# Patient Record
Sex: MALE | Race: WHITE | Employment: STUDENT | URBAN - METROPOLITAN AREA
[De-identification: names, ages, dates, MRNs, and addresses within clinical notes are randomized per-mention and may not be internally consistent; named-entity substitution may affect disease eponyms.]

---

## 2020-05-05 ENCOUNTER — MEDICAL CORRESPONDENCE (OUTPATIENT)
Dept: TRANSPLANT | Facility: CLINIC | Age: 20
End: 2020-05-05

## 2020-05-15 ENCOUNTER — MEDICAL CORRESPONDENCE (OUTPATIENT)
Dept: TRANSPLANT | Facility: CLINIC | Age: 20
End: 2020-05-15

## 2020-05-18 ENCOUNTER — MEDICAL CORRESPONDENCE (OUTPATIENT)
Dept: TRANSPLANT | Facility: CLINIC | Age: 20
End: 2020-05-18

## 2020-05-18 ENCOUNTER — CARE COORDINATION (OUTPATIENT)
Dept: TRANSPLANT | Facility: CLINIC | Age: 20
End: 2020-05-18

## 2020-05-18 NOTE — PROGRESS NOTES
"Spenser Juana, Mark's dad, emailed the following photos in preparation for their video NT on Wednesday, May 20th, 2020. Photos forwarded to primary BMT physician, Shelley Butt.     Spenser's email read:    \"Jamie Oropeza,    Here are a few photos of Mark s back which is scarred from the many years of blisters. That lower back/hips were always the problem area; however they healed up for now. The area most affected now is the upper thigh where your legs begins. 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    Thanks  Radhames\"               "

## 2020-05-19 ENCOUNTER — MEDICAL CORRESPONDENCE (OUTPATIENT)
Dept: TRANSPLANT | Facility: CLINIC | Age: 20
End: 2020-05-19

## 2020-05-20 ENCOUNTER — VIRTUAL VISIT (OUTPATIENT)
Dept: TRANSPLANT | Facility: CLINIC | Age: 20
End: 2020-05-20
Attending: PEDIATRICS
Payer: COMMERCIAL

## 2020-05-20 DIAGNOSIS — Q81.2 RECESSIVE DYSTROPHIC EPIDERMOLYSIS BULLOSA: Primary | ICD-10-CM

## 2020-05-20 PROCEDURE — 40001009 ZZH VIDEO/TELEPHONE VISIT; NO CHARGE

## 2020-05-20 NOTE — NURSING NOTE
"Mark Arias is a 20 year old male who is being evaluated via a billable video visit.      The patient has been notified of following:     \"This video visit will be conducted via a call between you and your physician/provider. We have found that certain health care needs can be provided without the need for an in-person physical exam.  This service lets us provide the care you need with a video conversation.  If a prescription is necessary we can send it directly to your pharmacy.  If lab work is needed we can place an order for that and you can then stop by our lab to have the test done at a later time.    If during the course of the call the physician/provider feels a video visit is not appropriate, you will not be charged for this service.\"     Patient has given verbal consent for Video visit? Yes    Patient would like the video invitation sent by: Send to e-mail at: [email or web address]    Video Start Time: 11:42 AM    Mark Arias complains of    Chief Complaint   Patient presents with     Consult     Patient is here for new EB consult       Data Unavailable  Data Unavailable      I have reviewed and updated the patient's Past Medical History, Social History, Family History and Medication List.    ALLERGIES  Amoxicillin      "

## 2020-05-20 NOTE — LETTER
"  2020      RE: Mark Arias  [address]  [facility] [address]       Mark Arias is a 20 year old male who is being evaluated via a billable video visit.      The patient has been notified of following:     \"This video visit will be conducted via a call between you and your physician/provider. We have found that certain health care needs can be provided without the need for an in-person physical exam.  This service lets us provide the care you need with a video conversation.  If a prescription is necessary we can send it directly to your pharmacy.  If lab work is needed we can place an order for that and you can then stop by our lab to have the test done at a later time.    Video visits are billed at different rates depending on your insurance coverage.  Please reach out to your insurance provider with any questions.    If during the course of the call the physician/provider feels a video visit is not appropriate, you will not be charged for this service.\"    Patient has given verbal consent for Video visit? Yes    How would you like to obtain your AVS? Haresh    Patient would like the video invitation sent by: Send to e-mail at: [email or web address]    Will anyone else be joining your video visit? No        Video-Visit Details    Type of service:  Video Visit    Video Start Time: 11:48  Video End Time: 12:43    Originating Location (pt. Location): Home    Distant Location (provider location):  [facility] BLOOD AND MARROW TRANSPLANT     Platform used for Video Visit: Lizbeth    May 20, 2020    Nain Brandt MD  PEDIATRIC SERVICES [address]  [facility] [address]    Re: Mark Arias  MRN: [id number]  : 2000    Dear Dr. Brandt,     I had the pleasure of seeing your patient, Mark Arias, via video visit, in the Pediatric Blood and Marrow Transplant clinic on May 20, 2020 for consultation regarding potential therapies, including the role of hematopoietic cell " transplant to treat epidermolysis bullosa. His father was present for the entire video visit. Though you know his history well, I will repeat it here for our records.     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 At birth he had areas eroded skin over his feet. Diagnosis of RDEB was confirmed on skin biopsy and genetic testing. 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 The second mutation (maternally inherieted), [id number] results in a substitution of an aspartic acid for a glycine. This latter mutation has been described in patients in inversa type RDEB.         In infancy and early childhood Mark describes recurrent erosions around his ankles. He then developed blistering on his hips and his back. When he was 16-17 years old, his back spontaneously healed. He has not had any further blistering in this area, though does have residual scarring. His main affected area currently is his groin. The blistering is variable in size, and duration to heal, taking anytime from 1 day to 1 week to resolve. Mark feels that the extent of blistering in his groin has improved slightly over the years, however with the difficulty in dressing and protecting this area, he experiences significant pain and discomfort. Mark does have the occasional mouth ulcer, approximately one new ulcer per month. When he does have mouth ulcers these tend to heal quickly. He has not had any eye involvement, nor any esophageal involvement. His current skin care regime consists of vaseline and body powder to reduce friction, and padded dressing for open blisters.      Mark has been proactive in seeking out potential therapies for his EB. He     Review of Systems:  A complete review of systems was performed and is negative except as noted above.     Past Medical History: Mark is otherwise well, without any other significant past medical history.     Medications: No current regular medications.     Family History: No family history of EB. No other significant family history.     Physical Exam:  There were no vitals taken for this video visit.   Mark was alert and interactive. He appeared well.   Photos of his back were reviewed and revealed extensive bilateral scarring over his lower back, with no active erosions.    Labs: All photos, labs and medical records were reviewed by me personally.     In summary, Mark Arias is a 20 year old male, with recessive dystrophic epidermolysis bullosa, inversa type. He is seeking information about any potential therapies that might be available, including the role of allogeneic hematopoietic stem cell transplant (alloHCT) in RDEB.     We spent 55 minutes time with Mark Arias and his family first reviewing his history and then reviewing the pathophysiology of his disease and the potential treatment options including current and upcoming clinical trials and alloHCT.     We first discussed the rationale for alloHCT in RDEB, in which the healthly donor cells produce normal amounts of type VII (C7), which improved wound healing. AlloHCT does not not cure EB, and the extent of improvement has been variable. The goal is to reduce the risk of life-threatening complications such as severe infection and squamous cell carcinoma formation, to reduce body-surface-area of disease involvement as well as internal organ manifestations, and to improve quality of life. We briefly discussed the stem cell transplant process, including chemotherapy and radiation based conditioning, prolonged hospital admission and the risks including risk of infection and risk of mortality. The risk of death early post transplant depends on the health of the patient  coming in, but on average is 10%. Given the risk of the transplant, and Mark's mild disease, we would not recommend him for transplant. We then discussed other therapies at the [facility] including the use of mesenchymal stromal cells (MSCs) and epidermal skin grafting. We reviewed with the family the reasoning behind these interventions, namely the immune modulatory impact of MSCs, potential ability to secrete C7, and the use of skin grafting to support healing of chronic wounds. At our institution we have utilized MSC's post transplant (donor derived), as well as third party, as part of a clinical trial. MSC's are infused intravenously, and in the phase 1 trial of ABCB5+ MSCs, 3 doses were given, each 2 weeks apart. The infusions were well tolerated. Patients will need to travel to our institution for the infusions. There were 16 patients enrolled on the phase 1 study, which has now closed to accrual. We ancipitate opening the next phase of this trial when it is ready, and this may be an option for Mark, should he be interested.     We then discussed the current clinical trials for RDEB. Mark had sought to enroll in the [place] tissue repair trial, which utilizes systemic recombinant factor 7. Unfortunately Mark did not meet eligibility criteria, based on not having wounds of the required minimum size, at the time of assessement eligibility. We discussed that currently this is a phase 1/2 dose escalation trial, with no significant toxicities in the study subjects. Currently their aim is the establish the optimum dose and frequency of infusions for optimal effect. If this therapy becomes available commercially, off study, then this may be an option for Mark in the future. There are also topical therapies currently being explored. , is a topical gene therapy. Phase 1 and 2 trials have been completed, and there is currently no open clinical trial, with the potential for a phase 3 trial in  the future. Gene therapy is the focus of much current research. There is pre-clinical research in which keratinocyte and fibroblasts from skin are isolated via biopsy, genetically modified and expanded in the lab, and then bioprinted or skin grafted to affected sites. We hope there will be clinical trials within the next 5 years.    We talked about the comprehensive EB clinic here at the [facility], in which we co-ordinate multidisciplinary review with multiple sub-specialties in one clinic including hematology/BMT, dermatology, GI and surgery. Our team of experts have extensive experience in caring for patients with EB. Though we do not recommend alloHCT, and do not have any current open clinical trials for Mark currently, we would be very happy to review him in our comprehensive clinic, should he feel that this may be beneficial. We would also be happy to arrange an isolated dermatology review to see if there are any other suggestions for his optimal management. We would be very happy to reach out to Mark and his family should any new trials open, that he would be eligible for.        Mark Arias and his family were given the opportunity to ask questions throughout our visit today. They did ask many thoughtful questions which were answered to the best of my ability. After our discussion it appeared that the family had a good grasp of the process, risks and benefits.     It was a pleasure meeting Mark Arias and his family today. We look forward to helping to care for him in the future. If you should have any questions or concerns about my recommendations, please don't hesitate to contact us.     Sincerely,    Paris Roca MD  Pediatric BMT Fellow    I met with Mark Arias and his Father with , edited the above note, and agree with the findings and plan of care as documented. Total face-to-face time 60 minutes, >50% in counseling on management of RDEB inversa, including investigational  therapies available at our center and others in the [place].     Shelley Butt MD MPH  , Pediatric Blood and Marrow Transplantation  [facility] [phone]        Shelley Butt MD

## 2020-05-21 NOTE — PROGRESS NOTES
"Mark Arias is a 20 year old male who is being evaluated via a billable video visit.      The patient has been notified of following:     \"This video visit will be conducted via a call between you and your physician/provider. We have found that certain health care needs can be provided without the need for an in-person physical exam.  This service lets us provide the care you need with a video conversation.  If a prescription is necessary we can send it directly to your pharmacy.  If lab work is needed we can place an order for that and you can then stop by our lab to have the test done at a later time.    Video visits are billed at different rates depending on your insurance coverage.  Please reach out to your insurance provider with any questions.    If during the course of the call the physician/provider feels a video visit is not appropriate, you will not be charged for this service.\"    Patient has given verbal consent for Video visit? Yes    How would you like to obtain your AVS? Ineshart    Patient would like the video invitation sent by: Send to e-mail at: [email or web address]    Will anyone else be joining your video visit? No        Video-Visit Details    Type of service:  Video Visit    Video Start Time: 11:48  Video End Time: 12:43    Originating Location (pt. Location): Home    Distant Location (provider location):  [facility] BLOOD AND MARROW TRANSPLANT     Platform used for Video Visit: Lizbeth    May 20, 2020    Nain Brandt MD  PEDIATRIC SERVICES [place] [address]    Re: Mark Arias  MRN: [id number]  : 2000    Dear Dr. Brandt,     I had the pleasure of seeing your patient, Mark Arias, via video visit, in the Pediatric Blood and Marrow Transplant clinic on May 20, 2020 for consultation regarding potential therapies, including the role of hematopoietic cell transplant to treat epidermolysis bullosa. His father was present for the entire video visit. " Though you know his history well, I will repeat it here for our records.     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 At birth he had areas eroded skin over his feet. Diagnosis of RDEB was confirmed on skin biopsy and genetic testing. 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 The second mutation (maternally inherieted), [id number] results in a substitution of an aspartic acid for a glycine. This latter mutation has been described in patients in inversa type RDEB.         In infancy and early childhood Mark describes recurrent erosions around his ankles. He then developed blistering on his hips and his back. When he was 16-17 years old, his back spontaneously healed. He has not had any further blistering in this area, though does have residual scarring. His main affected area currently is his groin. The blistering is variable in size, and duration to heal, taking anytime from 1 day to 1 week to resolve. Mark feels that the extent of blistering in his groin has improved slightly over the years, however with the difficulty in dressing and protecting this area, he experiences significant pain and discomfort. Mark does have the occasional mouth ulcer, approximately one new ulcer per month. When he does have mouth ulcers these tend to heal quickly. He has not had any eye involvement, nor any esophageal involvement. His current skin care regime consists of vaseline and body powder to reduce friction, and padded dressing for open blisters.      Mark has been proactive in seeking out potential therapies for his EB. He     Review of Systems: A complete review of systems was performed and is negative except as noted above.     Past  Medical History: Mark is otherwise well, without any other significant past medical history.     Medications: No current regular medications.     Family History: No family history of EB. No other significant family history.     Physical Exam:  There were no vitals taken for this video visit.   Mark was alert and interactive. He appeared well.   Photos of his back were reviewed and revealed extensive bilateral scarring over his lower back, with no active erosions.    Labs: All photos, labs and medical records were reviewed by me personally.     In summary, Mark Arias is a 20 year old male, with recessive dystrophic epidermolysis bullosa, inversa type. He is seeking information about any potential therapies that might be available, including the role of allogeneic hematopoietic stem cell transplant (alloHCT) in RDEB.     We spent 55 minutes time with Mark Arias and his family first reviewing his history and then reviewing the pathophysiology of his disease and the potential treatment options including current and upcoming clinical trials and alloHCT.     We first discussed the rationale for alloHCT in RDEB, in which the healthly donor cells produce normal amounts of type VII (C7), which improved wound healing. AlloHCT does not not cure EB, and the extent of improvement has been variable. The goal is to reduce the risk of life-threatening complications such as severe infection and squamous cell carcinoma formation, to reduce body-surface-area of disease involvement as well as internal organ manifestations, and to improve quality of life. We briefly discussed the stem cell transplant process, including chemotherapy and radiation based conditioning, prolonged hospital admission and the risks including risk of infection and risk of mortality. The risk of death early post transplant depends on the health of the patient coming in, but on average is 10%. Given the risk of the transplant, and Mark's mild disease,  we would not recommend him for transplant. We then discussed other therapies at the [facility] including the use of mesenchymal stromal cells (MSCs) and epidermal skin grafting. We reviewed with the family the reasoning behind these interventions, namely the immune modulatory impact of MSCs, potential ability to secrete C7, and the use of skin grafting to support healing of chronic wounds. At our institution we have utilized MSC's post transplant (donor derived), as well as third party, as part of a clinical trial. MSC's are infused intravenously, and in the phase 1 trial of ABCB5+ MSCs, 3 doses were given, each 2 weeks apart. The infusions were well tolerated. Patients will need to travel to our institution for the infusions. There were 16 patients enrolled on the phase 1 study, which has now closed to accrual. We ancipitate opening the next phase of this trial when it is ready, and this may be an option for Mark, should he be interested.     We then discussed the current clinical trials for RDEB. Mark had sought to enroll in the [place] tissue repair trial, which utilizes systemic recombinant factor 7. Unfortunately Mark did not meet eligibility criteria, based on not having wounds of the required minimum size, at the time of assessement eligibility. We discussed that currently this is a phase 1/2 dose escalation trial, with no significant toxicities in the study subjects. Currently their aim is the establish the optimum dose and frequency of infusions for optimal effect. If this therapy becomes available commercially, off study, then this may be an option for Mark in the future. There are also topical therapies currently being explored. , is a topical gene therapy. Phase 1 and 2 trials have been completed, and there is currently no open clinical trial, with the potential for a phase 3 trial in the future. Gene therapy is the focus of much current research. There is pre-clinical research  in which keratinocyte and fibroblasts from skin are isolated via biopsy, genetically modified and expanded in the lab, and then bioprinted or skin grafted to affected sites. We hope there will be clinical trials within the next 5 years.    We talked about the comprehensive EB clinic here at the [facility], in which we co-ordinate multidisciplinary review with multiple sub-specialties in one clinic including hematology/BMT, dermatology, GI and surgery. Our team of experts have extensive experience in caring for patients with EB. Though we do not recommend alloHCT, and do not have any current open clinical trials for Mark currently, we would be very happy to review him in our comprehensive clinic, should he feel that this may be beneficial. We would also be happy to arrange an isolated dermatology review to see if there are any other suggestions for his optimal management. We would be very happy to reach out to Mark and his family should any new trials open, that he would be eligible for.        Mark Arias and his family were given the opportunity to ask questions throughout our visit today. They did ask many thoughtful questions which were answered to the best of my ability. After our discussion it appeared that the family had a good grasp of the process, risks and benefits.     It was a pleasure meeting Mark Arias and his family today. We look forward to helping to care for him in the future. If you should have any questions or concerns about my recommendations, please don't hesitate to contact us.     Sincerely,    Paris Roca MD  Pediatric BMT Fellow    I met with Mark Arias and his Father with , edited the above note, and agree with the findings and plan of care as documented. Total face-to-face time 60 minutes, >50% in counseling on management of RDEB inversa, including investigational therapies available at our center and others in the [place].     Shelley Butt MD MPH  Assistant  Professor, Pediatric Blood and Marrow Transplantation  [facility] [phone]